# Patient Record
Sex: FEMALE | Race: WHITE | NOT HISPANIC OR LATINO | Employment: FULL TIME | URBAN - METROPOLITAN AREA
[De-identification: names, ages, dates, MRNs, and addresses within clinical notes are randomized per-mention and may not be internally consistent; named-entity substitution may affect disease eponyms.]

---

## 2018-08-29 ENCOUNTER — HISTORICAL (OUTPATIENT)
Dept: ADMINISTRATIVE | Facility: HOSPITAL | Age: 53
End: 2018-08-29

## 2020-12-07 ENCOUNTER — HISTORICAL (OUTPATIENT)
Dept: ADMINISTRATIVE | Facility: HOSPITAL | Age: 55
End: 2020-12-07

## 2021-11-02 ENCOUNTER — HISTORICAL (OUTPATIENT)
Dept: RADIOLOGY | Facility: HOSPITAL | Age: 56
End: 2021-11-02

## 2022-04-12 ENCOUNTER — HISTORICAL (OUTPATIENT)
Dept: ADMINISTRATIVE | Facility: HOSPITAL | Age: 57
End: 2022-04-12

## 2022-04-30 VITALS
WEIGHT: 172 LBS | DIASTOLIC BLOOD PRESSURE: 73 MMHG | BODY MASS INDEX: 28.66 KG/M2 | SYSTOLIC BLOOD PRESSURE: 121 MMHG | HEIGHT: 65 IN

## 2022-05-05 NOTE — HISTORICAL [facility]
This is a historical note converted from [place]. Formatting and pictures may have been removed.  Please reference Chelsea for original formatting and attached multimedia. Chief Complaint  Pt here for left upper arm pain x 5 weeks. Pt states no injuries, she woke up one morning after sleeping on side, had numbness and tingling down to fingers, has been applying heating pad; some relief...NG  History of Present Illness  She is a pleasant 55-year-old female whose had?left?arm pain since October 2020. ?It began when she awoken with numbness and tingling down to the wrist and fingers.? The pain has localized now to the bicep muscle and tricep muscle.? She notes it worse with movement of the arm. ?It somewhat better?with rest. ?She is tried a cold and heat.? She?denies any current numbness or tingling. ?She is tried topical?medications with some results. ?There is sort of a dull ache.  Review of Systems  Comprehensive review of system?was performed with no exceptions other than noted in the history of present illness  Physical Exam  Vitals & Measurements  T:?97.2? ?F (Oral)? HR:?78(Peripheral)? BP:?121/73?  HT:?165.00?cm? WT:?78.010?kg? BMI:?28.65?  Gen: WN, WD, NAD  Card/Res: NL breathing, +distal pulses  Abdomen: ND  Shoulder Exam:??????????Right??????????Left  Skin:??????????????????????????????Normal???????Normal  AC joint tenderness:??????????None??????????None  Forward Flexion:?????????????180 ??????????170  Abduction:?????????????????????180??????????? 180  External Rotation: ????????????? 80??????????????80  Internal Rotation?????????????? 80 ???????????? 80  Supraspinatus stress test?????? Neg??????????Neg  Cedeno Impingement:?? ?????Neg ?????????? ?Neg  Neer Impingement:?????????????Neg??????????+  Apprehension:???????????????????? Neg??????????Neg  OBriens:????????????????????????????Neg????????? Neg  Speeds test:??????????????????????? Neg??????????+  Strength:  External  Rotation:???????????????5/5???????????????5/5  Lift Off/belly press:????????????5/5???????????????5/5  ?   N-V status:?????????????????????????Intact??????????Intact  ?   C-spine: Normal ROM, NT  ?  ?  Assessment/Plan  1.?Cervical radiculopathy?M54.12  ?We will start some anti-inflammatory medicines. ?If her pain persist we can consider shoulder injection?the delineate between cervical radiculopathy and biceps tendinitis  Ordered:  Clinic Follow-up PRN, 12/07/20 8:58:00 CST, Future Order, [facility]  Office/Outpatient Visit Level 3 Established 69879 , Cervical radiculopathy, Orthopaedics Clinic, 12/07/20 8:58:00 CST  ?  Orders:  meloxicam, 15 mg = 1 tab(s), Oral, Daily, # 30 tab(s), 0 Refill(s), Pharmacy: [facility]/pharmacy #[id number], 165, cm, Height/Length Dosing, 12/07/20 8:37:00 CST, 78.01, kg, Weight Dosing, 12/07/20 8:37:00 CST  XR Shoulder Left Minimum 2 Views, Routine, 12/07/20 8:39:00 CST, None, Ambulatory, Rad Type, Left arm pain, Not Scheduled, 12/07/20 8:39:00 CST  Referrals  Clinic Follow-up PRN, 12/07/20 8:58:00 CST, Future Order, Orthopaedics   Problem List/Past Medical History  Ongoing  Obesity  Tenosynovitis, de Quervain  Tobacco user  Historical  No qualifying data  Procedure/Surgical History  Ablation (07/01/2019)   Medications  Flomax 0.4 mg oral capsule, 0.4 mg= 1 cap(s), Oral, Daily,? ?Not Taking, Completed Rx  Mobic 15 mg oral tablet, 15 mg= 1 tab(s), Oral, Daily  pantoprazole 40 mg/NS 50 mL IVPB ([facility]), Daily  Zofran ODT 4 mg oral tablet, disintegrating, 4 mg= 1 tab(s), Oral, TID,? ?Not Taking, Completed Rx  Allergies  No Known Allergies  Social History  Abuse/Neglect  No, 12/07/2020  Alcohol  Current, Wine, Liquor, 1-2 times per month, 08/29/2018  Substance Use  Never, 08/29/2018  Tobacco  Former smoker, quit more than 30 days ago, N/A, 12/07/2020  Health Maintenance  Health Maintenance  ???Pending?(in the next year)  ??? ??OverDue  ??? ? ? ?Smoking Cessation due??01/01/20??and every  1??year(s)  ??? ??Due?  ??? ? ? ?ADL Screening due??12/07/20??and every 1??year(s)  ??? ? ? ?Tetanus Vaccine due??12/07/20??and every 10??year(s)  ??? ??Due In Future?  ??? ? ? ?Obesity Screening not due until??01/01/21??and every 1??year(s)  ??? ? ? ?Alcohol Misuse Screening not due until??01/02/21??and every 1??year(s)  ???Satisfied?(in the past 1 year)  ??? ??Satisfied?  ??? ? ? ?Alcohol Misuse Screening on??12/07/20.??Satisfied by Ramakrishna Cohen  ??? ? ? ?Obesity Screening on??12/07/20.??Satisfied by Ramakrishna Cohen  ?  Diagnostic Results  Shoulder radiographs show normal bony alignment

## 2022-05-05 NOTE — HISTORICAL [facility]
This is a historical note converted from [place]. Formatting and pictures may have been removed.  Please reference [place] for original formatting and attached multimedia. Chief Complaint  RIGHT WIRST DOI 8/6/18 SHE STATES HSE HAS HAD SOME DENTIAL WORK DONE AND AFTER STARTED WITH NUMBNESS AND PAIN. SHE STATES HER PAIN IS AT A 3 TODAY.  History of Present Illness  She is a pleasant 52-year-old whose had off and on?pain over her right wrist. ?The pains localized?over the radial styloid. ?She notes it worse with activities. ?It somewhat better with rest. ?It does bother her at night.? She is tried a carpal tunnel brace without significant relief. ?She is using some anti-inflammatory medicines. ?She denies any numbness or tingling although has had some numbness and tingling after a dental procedure and a GYN surgery.  Review of Systems  Comprehensive review of system?was performed with no exceptions other than noted in the history of present illness  Physical Exam  Vitals & Measurements  BP:?138/78?  HT:?163?cm? HT:?163?cm? WT:?81.37?kg? WT:?81.37?kg? BMI:?30.63?  Gen: WN, WD, NAD  Card/Res: NL breathing, +distal pulses  Abdomen: ND  Muscular skeletal: She has tenderness palpation of her radial styloid.? She has pain with?thumb extension and flexion. ?She has no pain with grind test.? She is full range of motion of her wrist and fingers. ?She has no triggering. ?She has a mildly positive Finkelstein test.  Assessment/Plan  1.?Tenosynovitis, de Quervain  We will start some anti-inflammatory medicines and?an injection today. ?Also get her a thumb spica brace to use at night.  ?  Risks of cortisone were discussed with the patient including hypopigmentation subcutaneous fat atrophy, and post injection pain flare. The patient understood these risks and requested to proceed. The right first dorsal compartment was prepped with betadine. The 1cc of steroid injection was administered under sterile techinique. The injection was  administered in clinic by me, [place], and the patient tolerated the procedure well.  ?  Ordered:  betamethasone, 6 mg, Intra-Articular, Once, first dose 08/29/18 12:00:00 CDT, stop date 08/29/18 12:00:00 CDT  Lidocaine inj., 1 mL, Intra-Articular, Once, first dose 08/29/18 11:06:00 CDT, stop date 08/29/18 11:06:00 CDT  asp/inj small joint/bursa [id number] PC, 08/29/18 11:06:00 CDT, [facility], Routine, 08/29/18 11:06:00 CDT  Office/Outpatient Visit Level 3 New [id number] PC, Tenosynovitis, de Quervain, [facility], 08/29/18 11:06:00 CDT  ?  Orders:  Clinic Follow-up PRN, 08/29/18 11:06:00 CDT, Future Order, [facility]  XR Wrist Right 2 Views, Routine, 08/29/18 10:42:00 CDT, Pain, None, Ambulatory, Rad Type, Wrist pain, Not Scheduled, 08/29/18 10:42:00 CDT   Problem List/Past Medical History  Ongoing  Obesity  Tenosynovitis, de Quervain  Historical  No qualifying data  Medications  Celestone, 6 mg, Intra-Articular, Once  lidocaine 2% injectable solution, 1 mL, Intra-Articular, Once  pantoprazole 40 mg/NS 50 mL IVPB (H), Daily  Allergies  No Known Allergies  Social History  Alcohol  Current, Wine, Liquor, 1-2 times per month, 08/29/2018  Substance Abuse  Never, 08/29/2018  Tobacco  Light tobacco smoker Use:., 08/29/2018  Health Maintenance  Health Maintenance  ???Pending?(in the next year)  ??? ??Due?  ??? ? ? ?Alcohol Misuse Screening due??08/29/18??and every 1??year(s)  ??? ? ? ?Smoking Cessation due??08/29/18??and every 1??year(s)  ??? ? ? ?Tetanus Vaccine due??08/29/18??and every 10??year(s)  ???Satisfied?(in the past 1 year)  ??? ??Satisfied?  ??? ? ? ?Blood Pressure Screening on??08/29/18.??Satisfied by Jody Thornton  ??? ? ? ?Body Mass Index Check on??08/29/18.??Satisfied by Jody Thornton  ??? ? ? ?Depression Screening on??08/29/18.??Satisfied by Jody Thornton  ??? ? ? ?Obesity Screening on??08/29/18.??Satisfied by Jody Thornton  ?  ?  Diagnostic Results  Wrist radiographs?8/29/2018:  3 views of the wrist show?no arthrosis

## 2023-08-30 ENCOUNTER — HOSPITAL ENCOUNTER (EMERGENCY)
Facility: HOSPITAL | Age: 58
Discharge: HOME | End: 2023-08-30
Attending: STUDENT IN AN ORGANIZED HEALTH CARE EDUCATION/TRAINING PROGRAM
Payer: COMMERCIAL

## 2023-08-30 VITALS
HEART RATE: 79 BPM | WEIGHT: 163 LBS | DIASTOLIC BLOOD PRESSURE: 81 MMHG | SYSTOLIC BLOOD PRESSURE: 133 MMHG | OXYGEN SATURATION: 98 % | BODY MASS INDEX: 27.16 KG/M2 | RESPIRATION RATE: 16 BRPM | TEMPERATURE: 98 F | HEIGHT: 65 IN

## 2023-08-30 DIAGNOSIS — N20.0 KIDNEY STONE ON RIGHT SIDE: Primary | ICD-10-CM

## 2023-08-30 LAB
ALBUMIN SERPL-MCNC: 4.2 G/DL (ref 3.5–5)
ALBUMIN/GLOB SERPL: 1.5 RATIO (ref 1.1–2)
ALP SERPL-CCNC: 101 UNIT/L (ref 40–150)
ALT SERPL-CCNC: 23 UNIT/L (ref 0–55)
APPEARANCE UR: ABNORMAL
AST SERPL-CCNC: 16 UNIT/L (ref 5–34)
BACTERIA #/AREA URNS AUTO: ABNORMAL /HPF
BASOPHILS # BLD AUTO: 0.04 X10(3)/MCL
BASOPHILS NFR BLD AUTO: 0.5 %
BILIRUB SERPL-MCNC: 0.4 MG/DL
BILIRUB UR QL STRIP.AUTO: ABNORMAL
BUN SERPL-MCNC: 11.3 MG/DL (ref 9.8–20.1)
CALCIUM SERPL-MCNC: 9.7 MG/DL (ref 8.4–10.2)
CHLORIDE SERPL-SCNC: 106 MMOL/L (ref 98–107)
CO2 SERPL-SCNC: 25 MMOL/L (ref 22–29)
COLOR UR: ABNORMAL
CREAT SERPL-MCNC: 0.79 MG/DL (ref 0.55–1.02)
EOSINOPHIL # BLD AUTO: 0.06 X10(3)/MCL (ref 0–0.9)
EOSINOPHIL NFR BLD AUTO: 0.7 %
ERYTHROCYTE [DISTWIDTH] IN BLOOD BY AUTOMATED COUNT: 12.6 % (ref 11.5–17)
GFR SERPLBLD CREATININE-BSD FMLA CKD-EPI: >60 MLS/MIN/1.73/M2
GLOBULIN SER-MCNC: 2.8 GM/DL (ref 2.4–3.5)
GLUCOSE SERPL-MCNC: 150 MG/DL (ref 74–100)
GLUCOSE UR QL STRIP.AUTO: NEGATIVE
HCT VFR BLD AUTO: 44.4 % (ref 37–47)
HGB BLD-MCNC: 14.9 G/DL (ref 12–16)
IMM GRANULOCYTES # BLD AUTO: 0.09 X10(3)/MCL (ref 0–0.04)
IMM GRANULOCYTES NFR BLD AUTO: 1.1 %
KETONES UR QL STRIP.AUTO: ABNORMAL
LEUKOCYTE ESTERASE UR QL STRIP.AUTO: ABNORMAL
LYMPHOCYTES # BLD AUTO: 2.24 X10(3)/MCL (ref 0.6–4.6)
LYMPHOCYTES NFR BLD AUTO: 26.3 %
MCH RBC QN AUTO: 28.3 PG (ref 27–31)
MCHC RBC AUTO-ENTMCNC: 33.6 G/DL (ref 33–36)
MCV RBC AUTO: 84.3 FL (ref 80–94)
MONOCYTES # BLD AUTO: 0.64 X10(3)/MCL (ref 0.1–1.3)
MONOCYTES NFR BLD AUTO: 7.5 %
MUCOUS THREADS URNS QL MICRO: ABNORMAL /LPF
NEUTROPHILS # BLD AUTO: 5.44 X10(3)/MCL (ref 2.1–9.2)
NEUTROPHILS NFR BLD AUTO: 63.9 %
NITRITE UR QL STRIP.AUTO: POSITIVE
NRBC BLD AUTO-RTO: 0 %
PH UR STRIP.AUTO: 5 [PH]
PLATELET # BLD AUTO: 327 X10(3)/MCL (ref 130–400)
PMV BLD AUTO: 10.3 FL (ref 7.4–10.4)
POTASSIUM SERPL-SCNC: 4.4 MMOL/L (ref 3.5–5.1)
PROT SERPL-MCNC: 7 GM/DL (ref 6.4–8.3)
PROT UR QL STRIP.AUTO: ABNORMAL
RBC # BLD AUTO: 5.27 X10(6)/MCL (ref 4.2–5.4)
RBC #/AREA URNS AUTO: <5 /HPF
RBC UR QL AUTO: NEGATIVE
SODIUM SERPL-SCNC: 139 MMOL/L (ref 136–145)
SP GR UR STRIP.AUTO: 1.03 (ref 1–1.03)
SQUAMOUS #/AREA URNS AUTO: 15 /HPF
UROBILINOGEN UR STRIP-ACNC: 0.2
WBC # SPEC AUTO: 8.51 X10(3)/MCL (ref 4.5–11.5)
WBC #/AREA URNS AUTO: 8 /HPF

## 2023-08-30 PROCEDURE — 85025 COMPLETE CBC W/AUTO DIFF WBC: CPT | Performed by: PHYSICIAN ASSISTANT

## 2023-08-30 PROCEDURE — 96360 HYDRATION IV INFUSION INIT: CPT

## 2023-08-30 PROCEDURE — 80053 COMPREHEN METABOLIC PANEL: CPT | Performed by: PHYSICIAN ASSISTANT

## 2023-08-30 PROCEDURE — 81001 URINALYSIS AUTO W/SCOPE: CPT | Performed by: PHYSICIAN ASSISTANT

## 2023-08-30 PROCEDURE — 63600175 PHARM REV CODE 636 W HCPCS: Performed by: PHYSICIAN ASSISTANT

## 2023-08-30 PROCEDURE — 99284 EMERGENCY DEPT VISIT MOD MDM: CPT | Mod: 25

## 2023-08-30 RX ORDER — TRAMADOL HYDROCHLORIDE 50 MG/1
50 TABLET ORAL EVERY 6 HOURS PRN
Qty: 12 TABLET | Refills: 0 | Status: SHIPPED | OUTPATIENT
Start: 2023-08-30

## 2023-08-30 RX ORDER — KETOROLAC TROMETHAMINE 10 MG/1
10 TABLET, FILM COATED ORAL EVERY 6 HOURS PRN
Qty: 20 TABLET | Refills: 0 | Status: SHIPPED | OUTPATIENT
Start: 2023-08-30 | End: 2023-09-04

## 2023-08-30 RX ORDER — KETOROLAC TROMETHAMINE 30 MG/ML
30 INJECTION, SOLUTION INTRAMUSCULAR; INTRAVENOUS
Status: DISCONTINUED | OUTPATIENT
Start: 2023-08-30 | End: 2023-08-30 | Stop reason: HOSPADM

## 2023-08-30 RX ORDER — ONDANSETRON 4 MG/1
4 TABLET, ORALLY DISINTEGRATING ORAL EVERY 6 HOURS PRN
Qty: 30 TABLET | Refills: 0 | Status: SHIPPED | OUTPATIENT
Start: 2023-08-30

## 2023-08-30 RX ORDER — ONDANSETRON 2 MG/ML
4 INJECTION INTRAMUSCULAR; INTRAVENOUS
Status: DISCONTINUED | OUTPATIENT
Start: 2023-08-30 | End: 2023-08-30 | Stop reason: HOSPADM

## 2023-08-30 RX ADMIN — SODIUM CHLORIDE, POTASSIUM CHLORIDE, SODIUM LACTATE AND CALCIUM CHLORIDE 1000 ML: 600; 310; 30; 20 INJECTION, SOLUTION INTRAVENOUS at 03:08

## 2023-08-30 NOTE — ED PROVIDER NOTES
Encounter Date: 8/30/2023       History     Chief Complaint   Patient presents with    Vaginal Pain     Patient reports with vaginal pain since Sunday, started on abx Monday for uti, today reports right flank pain. Hx of kidney stones. Reports nausea.denies any fevers.     Flank Pain     The patient is a 57 y.o. female who presents to the Emergency Department with a chief complaint of right flank pain. Symptoms began today and have been resolved since onset.  Patient reports that she started with right flank pain around 11:30 a.m. this morning.  She states that while she was sitting in the lobby waiting to be seen she passed a kidney stone.  She states that she has had complete relief of pain.  Patient also reports that she is currently taking ciprofloxacin for a urinary tract infection.  She states that her gynecologist called her in [facility] today since the urinary complaints were not improving.  Her pain is currently rated as a 0/10 in severity. Associated symptoms include nausea. Symptoms are aggravated with nothing and there are no alleviating factors. The patient denies abdominal pain, fever, chills, or hematuria. She reports taking nothing prior to arrival with no relief of symptoms. No other reported symptoms at this time.      The history is provided by the patient. No  was used.   Flank Pain  This is a new problem. The current episode started 3 to 5 hours ago. The problem occurs daily. The problem has not changed since onset.Pertinent negatives include no chest pain, no abdominal pain, no headaches and no shortness of breath. Nothing aggravates the symptoms. Nothing relieves the symptoms. She has tried nothing for the symptoms. The treatment provided no relief.     Review of patient's allergies indicates:  No Known Allergies  History reviewed. No pertinent past medical history.  History reviewed. No pertinent surgical history.  History reviewed. No pertinent family history.  Social  History     Tobacco Use    Smoking status: Never    Smokeless tobacco: Never     Review of Systems   Constitutional:  Negative for fever.   HENT:  Negative for sore throat.    Respiratory:  Negative for shortness of breath.    Cardiovascular:  Negative for chest pain.   Gastrointestinal:  Positive for nausea. Negative for abdominal pain and vomiting.   Genitourinary:  Positive for flank pain and urgency. Negative for dysuria and hematuria.   Musculoskeletal:  Negative for back pain.   Skin:  Negative for rash.   Neurological:  Negative for weakness and headaches.   Hematological:  Does not bruise/bleed easily.   All other systems reviewed and are negative.      Physical Exam     Initial Vitals [08/30/23 1246]   BP Pulse Resp Temp SpO2   (!) 143/71 82 18 97.9 °F (36.6 °C) 98 %      MAP       --         Physical Exam    Nursing note and vitals reviewed.  Constitutional: She appears well-developed and well-nourished.   HENT:   Head: Normocephalic.   Right Ear: Hearing and tympanic membrane normal.   Left Ear: Hearing and tympanic membrane normal.   Mouth/Throat: Uvula is midline, oropharynx is clear and moist and mucous membranes are normal.   Eyes: Conjunctivae and EOM are normal. Pupils are equal, round, and reactive to light.   Cardiovascular:  Normal rate, regular rhythm, normal heart sounds and normal pulses.           Pulmonary/Chest: Effort normal and breath sounds normal.   Abdominal: Abdomen is soft. Bowel sounds are normal. There is no abdominal tenderness.     Lymphadenopathy:     She has no cervical adenopathy.   Neurological: She is alert. GCS eye subscore is 4. GCS verbal subscore is 5. GCS motor subscore is 6.   Skin: Skin is warm, dry and intact. Capillary refill takes less than 2 seconds.         ED Course   Procedures  Labs Reviewed   COMPREHENSIVE METABOLIC PANEL - Abnormal; Notable for the following components:       Result Value    Glucose Level 150 (*)     All other components within normal limits    URINALYSIS, REFLEX TO URINE CULTURE - Abnormal; Notable for the following components:    Color, UA Dark-Orange (*)     Appearance, UA Turbid (*)     Protein, UA 1+ (*)     Ketones, UA 1+ (*)     Bilirubin, UA 1+ (*)     Nitrites, UA Positive (*)     Leukocyte Esterase, UA 2+ (*)     All other components within normal limits   CBC WITH DIFFERENTIAL - Abnormal; Notable for the following components:    IG# 0.09 (*)     All other components within normal limits   URINALYSIS, MICROSCOPIC - Abnormal; Notable for the following components:    WBC, UA 8 (*)     Squamous Epithelial Cells, UA 15 (*)     Bacteria, UA 1+ (*)     Mucous, UA Moderate (*)     All other components within normal limits   CBC W/ AUTO DIFFERENTIAL    Narrative:     The following orders were created for panel order CBC auto differential.  Procedure                               Abnormality         Status                     ---------                               -----------         ------                     CBC with Differential[[id number]]        Abnormal            Final result                 Please view results for these tests on the individual orders.          Imaging Results              CT Abdomen Pelvis  Without Contrast (Final result)  Result time 08/30/23 13:23:34      Final result by Jina Gallegos MD (08/30/23 13:23:34)                   Impression:      1. Punctate right ureterovesical junction calculus with mild hydroureteronephrosis.  2. Bilateral nephrolithiasis  3. Diverticulosis  4. Small hiatal hernia  5. Small fat containing umbilical hernia      Electronically signed by: Jina Gallegos  Date:    08/30/2023  Time:    13:23               Narrative:    EXAMINATION:  CT ABDOMEN PELVIS WITHOUT CONTRAST    CLINICAL HISTORY:  Flank pain, kidney stone suspected;    TECHNIQUE:  Helically acquired axial images, sagittal and coronal reformations were obtained from the lung bases to the pubic symphysis without the IV administration  of contrast.    Automated tube current modulation, weight-based exposure dosing, and/or iterative reconstruction technique utilized to reach lowest reasonably achievable exposure rate.    DLP: 721 mGy*cm    COMPARISON:  CT abdomen pelvis 01/12/2022    FINDINGS:  HEART: Normal in size. No pericardial effusion.    LUNG BASES: Well aerated.    LIVER: Mild hepatic steatosis.    BILIARY: No calcified gallstones.    PANCREAS: No inflammatory change.    SPLEEN: Normal in size    ADRENALS: No mass.    KIDNEYS/URETERS: There is a 2 mm calculus at the right ureterovesical junction with mild right hydroureteronephrosis.  Punctate 1-2 mm bilateral renal caliceal calculi.  No left hydronephrosis.    GI TRACT/MESENTERY: Evaluation of the bowel is limited without contrast.  Small sliding hiatal hernia.  No evidence of bowel obstruction or inflammation. The appendix is normal. Colonic diverticulosis without acute inflammatory change.  There is a diverticulum of the 3rd portion of the duodenum.    PERITONEUM: No free fluid.No free air.    LYMPH NODES: No enlarged lymph nodes by size criteria.    VASCULATURE: Mild aortoiliac atherosclerosis.    BLADDER: Nondistended bladder limits CT evaluation    REPRODUCTIVE ORGANS: Normal as visualized.    ABDOMINAL WALL: Small fat containing umbilical hernia.    BONES: No acute osseous abnormality.                                       Medications   ondansetron injection 4 mg (4 mg Intravenous Not Given 8/30/23 1521)   ketorolac injection 30 mg (30 mg Intravenous Not Given 8/30/23 1520)   lactated ringers bolus 1,000 mL (0 mLs Intravenous Stopped 8/30/23 1619)     Medical Decision Making  Risk  Prescription drug management.               ED Course as of 08/30/23 1625   Wed Aug 30, 2023   1540 Patient states that her pain has completely resolved.  She states that she thinks she passed kidney stone while she was waiting in the lobby. [LM]   1545 Discussed with Dr. Pantoja who agrees with plan. [LM]    1606 Patient continues to deny any symptoms at this time.  She states that she is feeling well.  Patient's labs are unremarkable.  Kidney function is normal and she has no elevated white blood cell count.  Urinalysis is nitrite positive with 2+ leukocytes, 8 white blood cells.  However, patient does have 15 squamous epithelial cells.  Patient also reports that she has been taking Pyridium as needed for bladder discomfort.  I discussed results in detail with the patient including follow up.  Patient was given very strict ER return precautions. [LM]   1608 Patient's gynecologist did call Bactrim into the pharmacy for her today.  She states that she is going to  the Bactrim when she leaves the ER.  Patient well-appearing.  Will discharge home [LM]      ED Course User Index  [LM] Joaquín Berman, SHERITA               Medical Decision Making:   Initial Assessment:   The patient is a 57 y.o. female who presents to the Emergency Department with a chief complaint of right flank pain. Symptoms began today and have been resolved since onset.  Patient reports that she started with right flank pain around 11:30 a.m. this morning.  She states that while she was sitting in the lobby waiting to be seen she passed a kidney stone.  She states that she has had complete relief of pain.  Patient also reports that she is currently taking ciprofloxacin for a urinary tract infection.  She states that her gynecologist called her in Bactrim today since the urinary complaints were not improving.  Her pain is currently rated as a 0/10 in severity. Associated symptoms include nausea. Symptoms are aggravated with nothing and there are no alleviating factors. The patient denies abdominal pain, fever, chills, or hematuria. She reports taking nothing prior to arrival with no relief of symptoms. No other reported symptoms at this time.    Differential Diagnosis:   Differential diagnosis include but are not limited to kidney stone, urinary  tract infection, musculoskeletal pain, gastroenteritis  Clinical Tests:   Lab Tests: Ordered and Reviewed  Radiological Study: Ordered and Reviewed  ED Management:  MDM  History obtained by patient.   Co-morbidities and/or factors adding to the complexity or risk for the patient?: none  Differential diagnoses: Differential diagnosis include but are not limited to kidney stone, urinary tract infection, musculoskeletal pain, gastroenteritis  Decision to obtain previous or outside records?: no  Chart Review (nursing home, outside records, CareEverywhere): none  Review of RX medications/new RX prescribed by me?:  Zofran, Toradol, tramadol  My EKG Interpretation: see above  Labs/imaging/other tests obtained/considered (risk/benefits of testing discussed):  CBC, CMP, urinalysis, CT abdomen and pelvis  Labs/tests intepretation:  CT shows a punctate right UVJ calculus; labs unremarkable; UA is nitrite positive, 2+ leukocytes, 8 white blood cells, 15 epithelial cells  My independent imaging interpretation: none  Treatment/interventions, IV fluids, IV medications: ivfs, toradol  Complex management (IV controlled substances, went to OR, DNR, meds requiring monitoring, transfer, etc)?: none  Workup/treatment affected by social determinants of health?:none   Point of care US done/interpretation: none  Consults/radiologist/EMS/social work/family discussion/alternate history: none  Advanced care planning/end of life discussion: none  Shared decision making:  Shared decision-making with the patient  ETOH/smoking/drug cessation discussion:  None  Dispo:  Discharge home   Other:   I have discussed this case with another health care provider.       <> Summary of the Discussion: I have discussed this case with ED attending attending physician Dr. Pantoja.  Agrees with plan to have patient start taking Bactrim today an outpatient follow up with Urology.      Clinical Impression:   Final diagnoses:  [N20.0] Kidney stone on right side  (Primary)        ED Disposition Condition    Discharge Stable          ED Prescriptions       Medication Sig Dispense Start Date End Date Auth. Provider    ondansetron (ZOFRAN-ODT) 4 MG TbDL Take 1 tablet (4 mg total) by mouth every 6 (six) hours as needed (Nausea). 30 tablet 8/30/2023 -- Joaquín Berman NP    traMADoL (ULTRAM) 50 mg tablet Take 1 tablet (50 mg total) by mouth every 6 (six) hours as needed for Pain. 12 tablet 8/30/2023 -- Joaquín Berman NP    ketorolac (TORADOL) 10 mg tablet Take 1 tablet (10 mg total) by mouth every 6 (six) hours as needed for Pain. 20 tablet 8/30/2023 9/4/2023 Joaquín Berman NP          Follow-up Information       Follow up With Specialties Details Why Contact Info    Ryan Caal MD Urology Schedule an appointment as soon as possible for a visit  Please call and schedule an appointment.  I have sent a referral. [address] [facility]  [address]  [facility] [address]  [phone]               Joaquín Berman NP  08/30/23 0677

## 2023-08-30 NOTE — FIRST PROVIDER EVALUATION
"Medical screening examination initiated.  I have conducted a focused provider triage encounter, findings are as follows:    Brief history of present illness:  57-year-old female presents to ED for evaluation of right flank pain worsening today.  Patient reports that she had vaginal pain starting on Sunday and is currently being treated for UTI.    Vitals:    08/30/23 1246   BP: (!) 143/71   Pulse: 82   Resp: 18   Temp: 97.9 °F (36.6 °C)   TempSrc: Oral   SpO2: 98%   Weight: 73.9 kg (163 lb)   Height: 5' 5" (1.651 m)       Pertinent physical exam:  Patient is awake and alert and oriented.  Ambulatory to triage.     Brief workup plan:  labs, UA, CT renal stone    Preliminary workup initiated; this workup will be continued and followed by the physician or advanced practice provider that is assigned to the patient when roomed.  "